# Patient Record
Sex: MALE | Race: WHITE | NOT HISPANIC OR LATINO | URBAN - METROPOLITAN AREA
[De-identification: names, ages, dates, MRNs, and addresses within clinical notes are randomized per-mention and may not be internally consistent; named-entity substitution may affect disease eponyms.]

---

## 2020-08-19 ENCOUNTER — TELEPHONE ENCOUNTER (OUTPATIENT)
Dept: URBAN - METROPOLITAN AREA CLINIC 25 | Facility: CLINIC | Age: 45
End: 2020-08-19

## 2020-08-19 RX ORDER — MESALAMINE 1.2 G/1
2 TABLETS TABLET, DELAYED RELEASE ORAL
Qty: 180 | Refills: 2 | OUTPATIENT

## 2020-09-14 ENCOUNTER — LAB OUTSIDE AN ENCOUNTER (OUTPATIENT)
Dept: URBAN - METROPOLITAN AREA CLINIC 25 | Facility: CLINIC | Age: 45
End: 2020-09-14

## 2020-09-14 ENCOUNTER — TELEPHONE ENCOUNTER (OUTPATIENT)
Dept: URBAN - METROPOLITAN AREA CLINIC 92 | Facility: CLINIC | Age: 45
End: 2020-09-14

## 2020-09-14 ENCOUNTER — OFFICE VISIT (OUTPATIENT)
Dept: URBAN - METROPOLITAN AREA CLINIC 25 | Facility: CLINIC | Age: 45
End: 2020-09-14
Payer: COMMERCIAL

## 2020-09-14 DIAGNOSIS — K51.20 ULCERATIVE PROCTITIS WITHOUT COMPLICATION: ICD-10-CM

## 2020-09-14 DIAGNOSIS — K92.1 HEMATOCHEZIA: ICD-10-CM

## 2020-09-14 PROCEDURE — G8420 CALC BMI NORM PARAMETERS: HCPCS | Performed by: INTERNAL MEDICINE

## 2020-09-14 PROCEDURE — G8427 DOCREV CUR MEDS BY ELIG CLIN: HCPCS | Performed by: INTERNAL MEDICINE

## 2020-09-14 PROCEDURE — 99213 OFFICE O/P EST LOW 20 MIN: CPT | Performed by: INTERNAL MEDICINE

## 2020-09-14 PROCEDURE — G9903 PT SCRN TBCO ID AS NON USER: HCPCS | Performed by: INTERNAL MEDICINE

## 2020-09-14 RX ORDER — MESALAMINE 1.2 G/1
2 TABLETS TABLET, DELAYED RELEASE ORAL
Qty: 180 | Refills: 2 | Status: ACTIVE | COMMUNITY

## 2020-09-14 RX ORDER — TADALAFIL 20 MG/1
TABLET, COATED ORAL
Qty: 0 | Refills: 0 | Status: ACTIVE | COMMUNITY
Start: 1900-01-01

## 2020-09-14 RX ORDER — BUPROPION HYDROCHLORIDE 100 MG/1
TABLET, FILM COATED ORAL
Qty: 0 | Refills: 0 | Status: ACTIVE | COMMUNITY
Start: 1900-01-01

## 2020-09-14 NOTE — HPI-TODAY'S VISIT:
Sept 2020:  doing well on mesalamine 2.4 gm / day. labs in july 2020 - normal cbc, cmp, crp. no rectal bleeding.

## 2020-09-14 NOTE — HPI-OTHER HISTORIES
May 2020 - telemed visit. bowel frequency is at baseline. on mesalamine. no new meds. no abdominal pain . no rectal bleeding. feels well. needs mesalamine refill.   Nov 2019 -  Presently on mesalamine 1.2 grams BID. No rectal bleeding. No abdominal pain. Had labs done with PCP within the last 3 months.   Labs April 2018: normal CRP, HIV negative, normal kidney function, normal LFTs, HB 14.  Flex sig June 2018:  - The recto-sigmoid colon, sigmoid colon, descending colon and splenic flexure are normal. - Retroflexion attempted but not successful because of small rectal vault. Distal rectum and anal verge were carefully examined with multiple views. - Several biopsies were obtained in the rectum, in the sigmoid colon and in the descending colon. Path revealed focal active colitis.   March 2018 -   On Lialda 2 a day and canasa - every other day. No rectal pain. No rectal bleeding. 1 - 2 BM/day. No abdominal pain.     First saw me Dec 2016 with rectal pain and hemorrhoids. Taking on Truvada for HIV prevention therapy.   Colonoscopy in Jan 2017 revealed  poor prep. Non-thrombosed external hemorrhoids and perianal skin tag found on perianal exam. Inflamed, nodular and ulcerated mucosa in the rectum.  Biopsied.  The descending colon, splenic flexure, transverse colon, hepatic flexure, ascending colon and cecum are normal.  Biopsied. The examined portion of the ileum was normal.  Biopsied. Several biopsies were obtained in the descending colon, in the transverse colon, in the ascending colon and in the cecum.  Labs in Feb 2017 revealed elevated CRP, Hep B not immune, ASCA positive.  Normal TI  Bx. Normal Rt colon Bx. Normal TC Bx. Chronic active colitis in sigmoid colon. Chronic active colitis in rectum.  Repeat colonoscopy in March 2017 revealed external hemorrhoids, moderate proctitis, and patchy ulcerations in sigmoid colon.  Bx Chronic active colitis in rectum and sigmoid colon. No crypt distortion or granulomas seen.   CT Enterography  April 2017 - proctitis, normal small bowel.   IBD Diagnostic panel April 2017 - not consistent with IBD. PANCA elevated. CRP 6.4, MARIELY 181.6  He was started on Prednisone taper after his recent colonoscopy. He is almost done with this. He stopped Rowasa since this was not working. Apriso was causing bloating. We are trying Delzicol. He reports mild improvement in his symptoms. Mild rectal bleeding. Persistent tenesmus. Persistent fecal urgency.     CT Enterography  April 2017 - proctitis, normal small bowel.  IBD Diagnostic panel April 2017 - not consistent with IBD. PANCA elevated. CRP 6.4, MARIELY 181.6 July 2017 - Flex sig normal rectum and sigmoid colon. Fair prep. No colitis.

## 2021-04-27 ENCOUNTER — OFFICE VISIT (OUTPATIENT)
Dept: URBAN - METROPOLITAN AREA SURGERY CENTER 20 | Facility: SURGERY CENTER | Age: 46
End: 2021-04-27
Payer: COMMERCIAL

## 2021-04-27 DIAGNOSIS — D12.3 ADENOMA OF TRANSVERSE COLON: ICD-10-CM

## 2021-04-27 DIAGNOSIS — K51.00 CHRONIC PANCOLONIC ULCERATIVE COLITIS: ICD-10-CM

## 2021-04-27 PROCEDURE — 45380 COLONOSCOPY AND BIOPSY: CPT | Performed by: INTERNAL MEDICINE

## 2021-04-27 PROCEDURE — 45385 COLONOSCOPY W/LESION REMOVAL: CPT | Performed by: INTERNAL MEDICINE

## 2021-04-27 PROCEDURE — G8907 PT DOC NO EVENTS ON DISCHARG: HCPCS | Performed by: INTERNAL MEDICINE

## 2021-04-27 RX ORDER — TADALAFIL 20 MG/1
TABLET, COATED ORAL
Qty: 0 | Refills: 0 | Status: ACTIVE | COMMUNITY
Start: 1900-01-01

## 2021-04-27 RX ORDER — BUPROPION HYDROCHLORIDE 100 MG/1
TABLET, FILM COATED ORAL
Qty: 0 | Refills: 0 | Status: ACTIVE | COMMUNITY
Start: 1900-01-01

## 2021-04-27 RX ORDER — MESALAMINE 1.2 G/1
2 TABLETS TABLET, DELAYED RELEASE ORAL
Qty: 180 | Refills: 2 | Status: ACTIVE | COMMUNITY

## 2021-05-05 ENCOUNTER — TELEPHONE ENCOUNTER (OUTPATIENT)
Dept: URBAN - METROPOLITAN AREA CLINIC 92 | Facility: CLINIC | Age: 46
End: 2021-05-05

## 2021-07-01 ENCOUNTER — ERX REFILL RESPONSE (OUTPATIENT)
Dept: URBAN - METROPOLITAN AREA CLINIC 25 | Facility: CLINIC | Age: 46
End: 2021-07-01

## 2021-07-01 RX ORDER — MESALAMINE 1.2 G/1
2 TABLETS TABLET, DELAYED RELEASE ORAL
Qty: 180 | Refills: 2 | OUTPATIENT

## 2021-07-01 RX ORDER — MESALAMINE 1.2 G/1
2 TABLETS 2 TIMES PER DAY ORALLY 90 DAYS TABLET, DELAYED RELEASE ORAL
Qty: 180 TABLET | Refills: 3 | OUTPATIENT

## 2021-09-13 ENCOUNTER — OFFICE VISIT (OUTPATIENT)
Dept: URBAN - METROPOLITAN AREA CLINIC 25 | Facility: CLINIC | Age: 46
End: 2021-09-13

## 2021-10-11 ENCOUNTER — OFFICE VISIT (OUTPATIENT)
Dept: URBAN - METROPOLITAN AREA CLINIC 25 | Facility: CLINIC | Age: 46
End: 2021-10-11
Payer: COMMERCIAL

## 2021-10-11 DIAGNOSIS — K51.20 ULCERATIVE PROCTITIS WITHOUT COMPLICATION: ICD-10-CM

## 2021-10-11 PROCEDURE — 99213 OFFICE O/P EST LOW 20 MIN: CPT | Performed by: INTERNAL MEDICINE

## 2021-10-11 RX ORDER — MESALAMINE 1.2 G/1
2 TABLETS 2 TIMES PER DAY ORALLY 90 DAYS TABLET, DELAYED RELEASE ORAL
Qty: 180 TABLET | Refills: 3 | Status: ACTIVE | COMMUNITY

## 2021-10-11 RX ORDER — TADALAFIL 20 MG/1
TABLET, COATED ORAL
Qty: 0 | Refills: 0 | Status: ACTIVE | COMMUNITY
Start: 1900-01-01

## 2021-10-11 RX ORDER — BUPROPION HYDROCHLORIDE 100 MG/1
TABLET, FILM COATED ORAL
Qty: 0 | Refills: 0 | Status: ACTIVE | COMMUNITY
Start: 1900-01-01

## 2021-10-11 NOTE — HPI-TODAY'S VISIT:
October 2021 visit:   Patient underwent a colonoscopy up to TI in April 2021, 3 mm hepatic flexure polyp: Tubular adenoma, no evidence of any endoscopic colitis, repeat colonoscopy advised between 3 to 5 years, biopsies revealed quiesced sent colitis.  covid 19 infection in sept 2021 - despite vaccination, did not need hospitalization.  asymptomatic. no rectal bleeding. regular bowels. no abdominal pain.

## 2021-10-11 NOTE — HPI-OTHER HISTORIES
Sept 2020:  doing well on mesalamine 2.4 gm / day. labs in july 2020 - normal cbc, cmp, crp. no rectal bleeding.   May 2020 - telemed visit. bowel frequency is at baseline. on mesalamine. no new meds. no abdominal pain . no rectal bleeding. feels well. needs mesalamine refill.   Nov 2019 -  Presently on mesalamine 1.2 grams BID. No rectal bleeding. No abdominal pain. Had labs done with PCP within the last 3 months.   Labs April 2018: normal CRP, HIV negative, normal kidney function, normal LFTs, HB 14.  Flex sig June 2018:  - The recto-sigmoid colon, sigmoid colon, descending colon and splenic flexure are normal. - Retroflexion attempted but not successful because of small rectal vault. Distal rectum and anal verge were carefully examined with multiple views. - Several biopsies were obtained in the rectum, in the sigmoid colon and in the descending colon. Path revealed focal active colitis.   March 2018 -   On Lialda 2 a day and canasa - every other day. No rectal pain. No rectal bleeding. 1 - 2 BM/day. No abdominal pain.     First saw me Dec 2016 with rectal pain and hemorrhoids. Taking on Truvada for HIV prevention therapy.   Colonoscopy in Jan 2017 revealed  poor prep. Non-thrombosed external hemorrhoids and perianal skin tag found on perianal exam. Inflamed, nodular and ulcerated mucosa in the rectum.  Biopsied.  The descending colon, splenic flexure, transverse colon, hepatic flexure, ascending colon and cecum are normal.  Biopsied. The examined portion of the ileum was normal.  Biopsied. Several biopsies were obtained in the descending colon, in the transverse colon, in the ascending colon and in the cecum.  Labs in Feb 2017 revealed elevated CRP, Hep B not immune, ASCA positive.  Normal TI  Bx. Normal Rt colon Bx. Normal TC Bx. Chronic active colitis in sigmoid colon. Chronic active colitis in rectum.  Repeat colonoscopy in March 2017 revealed external hemorrhoids, moderate proctitis, and patchy ulcerations in sigmoid colon.  Bx Chronic active colitis in rectum and sigmoid colon. No crypt distortion or granulomas seen.   CT Enterography  April 2017 - proctitis, normal small bowel.   IBD Diagnostic panel April 2017 - not consistent with IBD. PANCA elevated. CRP 6.4, MARIELY 181.6  He was started on Prednisone taper after his recent colonoscopy. He is almost done with this. He stopped Rowasa since this was not working. Apriso was causing bloating. We are trying Delzicol. He reports mild improvement in his symptoms. Mild rectal bleeding. Persistent tenesmus. Persistent fecal urgency.     CT Enterography  April 2017 - proctitis, normal small bowel.  IBD Diagnostic panel April 2017 - not consistent with IBD. PANCA elevated. CRP 6.4, MARIELY 181.6 July 2017 - Flex sig normal rectum and sigmoid colon. Fair prep. No colitis.

## 2022-03-24 ENCOUNTER — ERX REFILL RESPONSE (OUTPATIENT)
Dept: URBAN - METROPOLITAN AREA CLINIC 25 | Facility: CLINIC | Age: 47
End: 2022-03-24

## 2022-03-24 RX ORDER — MESALAMINE 1.2 G/1
2 TABLETS 2 TIMES PER DAY ORALLY 90 DAYS TABLET, DELAYED RELEASE ORAL
Qty: 180 TABLET | Refills: 3 | OUTPATIENT

## 2022-03-24 RX ORDER — MESALAMINE 1.2 G/1
2 TABLETS TABLET, DELAYED RELEASE ORAL ONCE A DAY
Qty: 180 TABLET | Refills: 6 | OUTPATIENT

## 2022-04-11 ENCOUNTER — OFFICE VISIT (OUTPATIENT)
Dept: URBAN - METROPOLITAN AREA CLINIC 124 | Facility: CLINIC | Age: 47
End: 2022-04-11

## 2022-04-18 ENCOUNTER — OFFICE VISIT (OUTPATIENT)
Dept: URBAN - METROPOLITAN AREA CLINIC 25 | Facility: CLINIC | Age: 47
End: 2022-04-18
Payer: COMMERCIAL

## 2022-04-18 VITALS
DIASTOLIC BLOOD PRESSURE: 76 MMHG | TEMPERATURE: 97.4 F | HEIGHT: 72 IN | BODY MASS INDEX: 22.75 KG/M2 | SYSTOLIC BLOOD PRESSURE: 117 MMHG | WEIGHT: 168 LBS | HEART RATE: 80 BPM

## 2022-04-18 DIAGNOSIS — K51.20 ULCERATIVE PROCTITIS WITHOUT COMPLICATION: ICD-10-CM

## 2022-04-18 PROCEDURE — 99213 OFFICE O/P EST LOW 20 MIN: CPT | Performed by: PHYSICIAN ASSISTANT

## 2022-04-18 RX ORDER — BUPROPION HYDROCHLORIDE 100 MG/1
TABLET, FILM COATED ORAL
Qty: 0 | Refills: 0 | Status: ACTIVE | COMMUNITY
Start: 1900-01-01

## 2022-04-18 RX ORDER — TADALAFIL 20 MG/1
TABLET, COATED ORAL
Qty: 0 | Refills: 0 | Status: ACTIVE | COMMUNITY
Start: 1900-01-01

## 2022-04-18 RX ORDER — MESALAMINE 1.2 G/1
2 TABLETS TABLET, DELAYED RELEASE ORAL ONCE A DAY
Qty: 180 TABLET | Refills: 6 | Status: ACTIVE | COMMUNITY

## 2022-04-18 NOTE — HPI-OTHER HISTORIES
October 2021 visit:   Patient underwent a colonoscopy up to TI in April 2021, 3 mm hepatic flexure polyp: Tubular adenoma, no evidence of any endoscopic colitis, repeat colonoscopy advised between 3 to 5 years, biopsies revealed quiesced sent colitis.  covid 19 infection in sept 2021 - despite vaccination, did not need hospitalization.  asymptomatic. no rectal bleeding. regular bowels. no abdominal pain.  Sept 2020:  doing well on mesalamine 2.4 gm / day. labs in july 2020 - normal cbc, cmp, crp. no rectal bleeding.   May 2020 - telemed visit. bowel frequency is at baseline. on mesalamine. no new meds. no abdominal pain . no rectal bleeding. feels well. needs mesalamine refill.   Nov 2019 -  Presently on mesalamine 1.2 grams BID. No rectal bleeding. No abdominal pain. Had labs done with PCP within the last 3 months.   Labs April 2018: normal CRP, HIV negative, normal kidney function, normal LFTs, HB 14.  Flex sig June 2018:  - The recto-sigmoid colon, sigmoid colon, descending colon and splenic flexure are normal. - Retroflexion attempted but not successful because of small rectal vault. Distal rectum and anal verge were carefully examined with multiple views. - Several biopsies were obtained in the rectum, in the sigmoid colon and in the descending colon. Path revealed focal active colitis.   March 2018 -   On Lialda 2 a day and canasa - every other day. No rectal pain. No rectal bleeding. 1 - 2 BM/day. No abdominal pain.     First saw me Dec 2016 with rectal pain and hemorrhoids. Taking on Truvada for HIV prevention therapy.   Colonoscopy in Jan 2017 revealed  poor prep. Non-thrombosed external hemorrhoids and perianal skin tag found on perianal exam. Inflamed, nodular and ulcerated mucosa in the rectum.  Biopsied.  The descending colon, splenic flexure, transverse colon, hepatic flexure, ascending colon and cecum are normal.  Biopsied. The examined portion of the ileum was normal.  Biopsied. Several biopsies were obtained in the descending colon, in the transverse colon, in the ascending colon and in the cecum.  Labs in Feb 2017 revealed elevated CRP, Hep B not immune, ASCA positive.  Normal TI  Bx. Normal Rt colon Bx. Normal TC Bx. Chronic active colitis in sigmoid colon. Chronic active colitis in rectum.  Repeat colonoscopy in March 2017 revealed external hemorrhoids, moderate proctitis, and patchy ulcerations in sigmoid colon.  Bx Chronic active colitis in rectum and sigmoid colon. No crypt distortion or granulomas seen.   CT Enterography  April 2017 - proctitis, normal small bowel.   IBD Diagnostic panel April 2017 - not consistent with IBD. PANCA elevated. CRP 6.4, MARIELY 181.6  He was started on Prednisone taper after his recent colonoscopy. He is almost done with this. He stopped Rowasa since this was not working. Apriso was causing bloating. We are trying Delzicol. He reports mild improvement in his symptoms. Mild rectal bleeding. Persistent tenesmus. Persistent fecal urgency.     CT Enterography  April 2017 - proctitis, normal small bowel.  IBD Diagnostic panel April 2017 - not consistent with IBD. PANCA elevated. CRP 6.4, MARIELY 181.6 July 2017 - Flex sig normal rectum and sigmoid colon. Fair prep. No colitis.

## 2022-04-18 NOTE — HPI-TODAY'S VISIT:
April 2022 Visit: Pt here for 4 month follow up on ulcerative colitis. Pt states symptoms are controlled on mesalamine. Pt's last colonoscopy 2021. We do not have labs from last visit. Pt denies abdominal pain, nausea, vomiting, or blood in stool. Denies any unintentional weight loss. His appetite is good. He reports compliance with mesalamine.

## 2022-10-17 ENCOUNTER — OFFICE VISIT (OUTPATIENT)
Dept: URBAN - METROPOLITAN AREA CLINIC 25 | Facility: CLINIC | Age: 47
End: 2022-10-17
Payer: COMMERCIAL

## 2022-10-17 VITALS
HEIGHT: 72 IN | HEART RATE: 70 BPM | DIASTOLIC BLOOD PRESSURE: 73 MMHG | WEIGHT: 167 LBS | TEMPERATURE: 97.5 F | BODY MASS INDEX: 22.62 KG/M2 | SYSTOLIC BLOOD PRESSURE: 123 MMHG

## 2022-10-17 DIAGNOSIS — K51.00 ULCERATIVE PANCOLITIS WITHOUT COMPLICATION: ICD-10-CM

## 2022-10-17 DIAGNOSIS — D12.6 TUBULAR ADENOMA OF COLON: ICD-10-CM

## 2022-10-17 PROBLEM — 444548001: Status: ACTIVE | Noted: 2022-10-17

## 2022-10-17 PROCEDURE — 99213 OFFICE O/P EST LOW 20 MIN: CPT | Performed by: INTERNAL MEDICINE

## 2022-10-17 RX ORDER — TADALAFIL 20 MG/1
TABLET, COATED ORAL
Qty: 0 | Refills: 0 | Status: ACTIVE | COMMUNITY
Start: 1900-01-01

## 2022-10-17 RX ORDER — MESALAMINE 1.2 G/1
2 TABLETS TABLET, DELAYED RELEASE ORAL ONCE A DAY
Qty: 180 TABLET | Refills: 6 | Status: ACTIVE | COMMUNITY

## 2022-10-17 RX ORDER — BUPROPION HYDROCHLORIDE 100 MG/1
TABLET, FILM COATED ORAL
Qty: 0 | Refills: 0 | Status: DISCONTINUED | COMMUNITY
Start: 1900-01-01

## 2022-10-17 NOTE — HPI-OTHER HISTORIES
April 2022 Visit: Pt here for 4 month follow up on ulcerative colitis. Pt states symptoms are controlled on mesalamine. Pt's last colonoscopy 2021. We do not have labs from last visit. Pt denies abdominal pain, nausea, vomiting, or blood in stool. Denies any unintentional weight loss. His appetite is good. He reports compliance with mesalamine.  October 2021 visit:   Patient underwent a colonoscopy up to TI in April 2021, 3 mm hepatic flexure polyp: Tubular adenoma, no evidence of any endoscopic colitis, repeat colonoscopy advised between 3 to 5 years, biopsies revealed quiesced sent colitis.  covid 19 infection in sept 2021 - despite vaccination, did not need hospitalization.  asymptomatic. no rectal bleeding. regular bowels. no abdominal pain.  Sept 2020:  doing well on mesalamine 2.4 gm / day. labs in july 2020 - normal cbc, cmp, crp. no rectal bleeding.   May 2020 - telemed visit. bowel frequency is at baseline. on mesalamine. no new meds. no abdominal pain . no rectal bleeding. feels well. needs mesalamine refill.   Nov 2019 -  Presently on mesalamine 1.2 grams BID. No rectal bleeding. No abdominal pain. Had labs done with PCP within the last 3 months.   Labs April 2018: normal CRP, HIV negative, normal kidney function, normal LFTs, HB 14.  Flex sig June 2018:  - The recto-sigmoid colon, sigmoid colon, descending colon and splenic flexure are normal. - Retroflexion attempted but not successful because of small rectal vault. Distal rectum and anal verge were carefully examined with multiple views. - Several biopsies were obtained in the rectum, in the sigmoid colon and in the descending colon. Path revealed focal active colitis.   March 2018 -   On Lialda 2 a day and canasa - every other day. No rectal pain. No rectal bleeding. 1 - 2 BM/day. No abdominal pain.     First saw me Dec 2016 with rectal pain and hemorrhoids. Taking on Truvada for HIV prevention therapy.   Colonoscopy in Jan 2017 revealed  poor prep. Non-thrombosed external hemorrhoids and perianal skin tag found on perianal exam. Inflamed, nodular and ulcerated mucosa in the rectum.  Biopsied.  The descending colon, splenic flexure, transverse colon, hepatic flexure, ascending colon and cecum are normal.  Biopsied. The examined portion of the ileum was normal.  Biopsied. Several biopsies were obtained in the descending colon, in the transverse colon, in the ascending colon and in the cecum.  Labs in Feb 2017 revealed elevated CRP, Hep B not immune, ASCA positive.  Normal TI  Bx. Normal Rt colon Bx. Normal TC Bx. Chronic active colitis in sigmoid colon. Chronic active colitis in rectum.  Repeat colonoscopy in March 2017 revealed external hemorrhoids, moderate proctitis, and patchy ulcerations in sigmoid colon.  Bx Chronic active colitis in rectum and sigmoid colon. No crypt distortion or granulomas seen.   CT Enterography  April 2017 - proctitis, normal small bowel.   IBD Diagnostic panel April 2017 - not consistent with IBD. PANCA elevated. CRP 6.4, MARIELY 181.6  He was started on Prednisone taper after his recent colonoscopy. He is almost done with this. He stopped Rowasa since this was not working. Apriso was causing bloating. We are trying Delzicol. He reports mild improvement in his symptoms. Mild rectal bleeding. Persistent tenesmus. Persistent fecal urgency.     CT Enterography  April 2017 - proctitis, normal small bowel.  IBD Diagnostic panel April 2017 - not consistent with IBD. PANCA elevated. CRP 6.4, MARIELY 181.6 July 2017 - Flex sig normal rectum and sigmoid colon. Fair prep. No colitis.

## 2022-10-17 NOTE — HPI-TODAY'S VISIT:
October 22 visit: Labs from October 22 revealed normal C-reactive protein, hemoglobin 14.2. most days having daily BM. stool freq is at baseline. no rectal bleeding. no abdominal pain. on mesalamine.

## 2023-03-20 ENCOUNTER — OFFICE VISIT (OUTPATIENT)
Dept: URBAN - METROPOLITAN AREA CLINIC 25 | Facility: CLINIC | Age: 48
End: 2023-03-20

## 2023-04-04 ENCOUNTER — ERX REFILL RESPONSE (OUTPATIENT)
Dept: URBAN - METROPOLITAN AREA CLINIC 25 | Facility: CLINIC | Age: 48
End: 2023-04-04

## 2023-04-04 RX ORDER — MESALAMINE 1.2 G/1
2 TABLETS TABLET, DELAYED RELEASE ORAL ONCE A DAY
Qty: 180 TABLET | Refills: 6 | OUTPATIENT

## 2023-04-24 ENCOUNTER — OFFICE VISIT (OUTPATIENT)
Dept: URBAN - METROPOLITAN AREA CLINIC 25 | Facility: CLINIC | Age: 48
End: 2023-04-24
Payer: COMMERCIAL

## 2023-04-24 VITALS
HEART RATE: 62 BPM | BODY MASS INDEX: 23.11 KG/M2 | TEMPERATURE: 97.5 F | DIASTOLIC BLOOD PRESSURE: 79 MMHG | WEIGHT: 170.6 LBS | SYSTOLIC BLOOD PRESSURE: 129 MMHG | HEIGHT: 72 IN

## 2023-04-24 DIAGNOSIS — D12.6 TUBULAR ADENOMA OF COLON: ICD-10-CM

## 2023-04-24 DIAGNOSIS — K51.00 ULCERATIVE PANCOLITIS WITHOUT COMPLICATION: ICD-10-CM

## 2023-04-24 PROCEDURE — 99213 OFFICE O/P EST LOW 20 MIN: CPT | Performed by: INTERNAL MEDICINE

## 2023-04-24 RX ORDER — TADALAFIL 20 MG/1
TABLET, COATED ORAL
Qty: 0 | Refills: 0 | Status: ACTIVE | COMMUNITY
Start: 1900-01-01

## 2023-04-24 RX ORDER — MESALAMINE 1.2 G/1
2 TABLETS TABLET, DELAYED RELEASE ORAL ONCE A DAY
Qty: 180 TABLET | Refills: 6 | Status: ACTIVE | COMMUNITY

## 2023-04-24 NOTE — HPI-OTHER HISTORIES
October 22 visit: Labs from October 22 revealed normal C-reactive protein, hemoglobin 14.2. most days having daily BM. stool freq is at baseline. no rectal bleeding. no abdominal pain. on mesalamine.  April 2022 Visit: Pt here for 4 month follow up on ulcerative colitis. Pt states symptoms are controlled on mesalamine. Pt's last colonoscopy 2021. We do not have labs from last visit. Pt denies abdominal pain, nausea, vomiting, or blood in stool. Denies any unintentional weight loss. His appetite is good. He reports compliance with mesalamine.  October 2021 visit:   Patient underwent a colonoscopy up to TI in April 2021, 3 mm hepatic flexure polyp: Tubular adenoma, no evidence of any endoscopic colitis, repeat colonoscopy advised between 3 to 5 years, biopsies revealed quiesced sent colitis.  covid 19 infection in sept 2021 - despite vaccination, did not need hospitalization.  asymptomatic. no rectal bleeding. regular bowels. no abdominal pain.  Sept 2020:  doing well on mesalamine 2.4 gm / day. labs in july 2020 - normal cbc, cmp, crp. no rectal bleeding.   May 2020 - telemed visit. bowel frequency is at baseline. on mesalamine. no new meds. no abdominal pain . no rectal bleeding. feels well. needs mesalamine refill.   Nov 2019 -  Presently on mesalamine 1.2 grams BID. No rectal bleeding. No abdominal pain. Had labs done with PCP within the last 3 months.   Labs April 2018: normal CRP, HIV negative, normal kidney function, normal LFTs, HB 14.  Flex sig June 2018:  - The recto-sigmoid colon, sigmoid colon, descending colon and splenic flexure are normal. - Retroflexion attempted but not successful because of small rectal vault. Distal rectum and anal verge were carefully examined with multiple views. - Several biopsies were obtained in the rectum, in the sigmoid colon and in the descending colon. Path revealed focal active colitis.   March 2018 -   On Lialda 2 a day and canasa - every other day. No rectal pain. No rectal bleeding. 1 - 2 BM/day. No abdominal pain.     First saw me Dec 2016 with rectal pain and hemorrhoids. Taking on Truvada for HIV prevention therapy.   Colonoscopy in Jan 2017 revealed  poor prep. Non-thrombosed external hemorrhoids and perianal skin tag found on perianal exam. Inflamed, nodular and ulcerated mucosa in the rectum.  Biopsied.  The descending colon, splenic flexure, transverse colon, hepatic flexure, ascending colon and cecum are normal.  Biopsied. The examined portion of the ileum was normal.  Biopsied. Several biopsies were obtained in the descending colon, in the transverse colon, in the ascending colon and in the cecum.  Labs in Feb 2017 revealed elevated CRP, Hep B not immune, ASCA positive.  Normal TI  Bx. Normal Rt colon Bx. Normal TC Bx. Chronic active colitis in sigmoid colon. Chronic active colitis in rectum.  Repeat colonoscopy in March 2017 revealed external hemorrhoids, moderate proctitis, and patchy ulcerations in sigmoid colon.  Bx Chronic active colitis in rectum and sigmoid colon. No crypt distortion or granulomas seen.   CT Enterography  April 2017 - proctitis, normal small bowel.   IBD Diagnostic panel April 2017 - not consistent with IBD. PANCA elevated. CRP 6.4, MARIELY 181.6  He was started on Prednisone taper after his recent colonoscopy. He is almost done with this. He stopped Rowasa since this was not working. Apriso was causing bloating. We are trying Delzicol. He reports mild improvement in his symptoms. Mild rectal bleeding. Persistent tenesmus. Persistent fecal urgency.     CT Enterography  April 2017 - proctitis, normal small bowel.  IBD Diagnostic panel April 2017 - not consistent with IBD. PANCA elevated. CRP 6.4, MARIELY 181.6 July 2017 - Flex sig normal rectum and sigmoid colon. Fair prep. No colitis.

## 2023-05-18 LAB — CALPROTECTIN, STOOL - QDX: (no result)

## 2023-05-24 ENCOUNTER — WEB ENCOUNTER (OUTPATIENT)
Dept: URBAN - METROPOLITAN AREA CLINIC 25 | Facility: CLINIC | Age: 48
End: 2023-05-24

## 2023-05-25 ENCOUNTER — WEB ENCOUNTER (OUTPATIENT)
Dept: URBAN - METROPOLITAN AREA CLINIC 25 | Facility: CLINIC | Age: 48
End: 2023-05-25

## 2023-05-25 ENCOUNTER — LAB OUTSIDE AN ENCOUNTER (OUTPATIENT)
Dept: URBAN - METROPOLITAN AREA CLINIC 25 | Facility: CLINIC | Age: 48
End: 2023-05-25

## 2023-05-25 RX ORDER — SIMETHICONE 125 MG
1 TABLET AFTER MEALS AND AT BEDTIME AS NEEDED TABLET,CHEWABLE ORAL
Qty: 40 TABLET | Refills: 0 | OUTPATIENT
Start: 2023-05-25 | End: 2023-06-04

## 2023-12-11 ENCOUNTER — OFFICE VISIT (OUTPATIENT)
Dept: URBAN - METROPOLITAN AREA CLINIC 25 | Facility: CLINIC | Age: 48
End: 2023-12-11
Payer: COMMERCIAL

## 2023-12-11 VITALS
HEART RATE: 92 BPM | HEIGHT: 72 IN | WEIGHT: 162 LBS | SYSTOLIC BLOOD PRESSURE: 130 MMHG | TEMPERATURE: 97.7 F | BODY MASS INDEX: 21.94 KG/M2 | DIASTOLIC BLOOD PRESSURE: 81 MMHG

## 2023-12-11 DIAGNOSIS — K51.00 ULCERATIVE PANCOLITIS WITHOUT COMPLICATION: ICD-10-CM

## 2023-12-11 DIAGNOSIS — D12.6 TUBULAR ADENOMA OF COLON: ICD-10-CM

## 2023-12-11 PROCEDURE — 99213 OFFICE O/P EST LOW 20 MIN: CPT | Performed by: INTERNAL MEDICINE

## 2023-12-11 RX ORDER — TADALAFIL 20 MG/1
TABLET, COATED ORAL
Qty: 0 | Refills: 0 | Status: ACTIVE | COMMUNITY
Start: 1900-01-01

## 2023-12-11 RX ORDER — MESALAMINE 1.2 G/1
2 TABLETS TABLET, DELAYED RELEASE ORAL ONCE A DAY
Qty: 180 TABLET | Refills: 6
End: 2025-09-01

## 2023-12-11 RX ORDER — MESALAMINE 1.2 G/1
2 TABLETS TABLET, DELAYED RELEASE ORAL ONCE A DAY
Qty: 180 TABLET | Refills: 6 | Status: ACTIVE | COMMUNITY

## 2023-12-11 NOTE — HPI-TODAY'S VISIT:
December 2023 visit: Fecal calprotectin in May 2023 was normal. no new symptoms. on mesalamine. no GI issues. normal cbc / cmp in april 2023. he did physcial exam [id number] with pcp and labs were normal. bowel freq is at baseline.

## 2023-12-11 NOTE — HPI-OTHER HISTORIES
April 2023 visit : no new symptoms. on mesalamine. no GI issues. normal cbc / cmp in april 2023.  October 22 visit: Labs from October 22 revealed normal C-reactive protein, hemoglobin 14.2. most days having daily BM. stool freq is at baseline. no rectal bleeding. no abdominal pain. on mesalamine.  April 2022 Visit: Pt here for 4 month follow up on ulcerative colitis. Pt states symptoms are controlled on mesalamine. Pt's last colonoscopy 2021. We do not have labs from last visit. Pt denies abdominal pain, nausea, vomiting, or blood in stool. Denies any unintentional weight loss. His appetite is good. He reports compliance with mesalamine.  October 2021 visit:   Patient underwent a colonoscopy up to TI in April 2021, 3 mm hepatic flexure polyp: Tubular adenoma, no evidence of any endoscopic colitis, repeat colonoscopy advised between 3 to 5 years, biopsies revealed quiesced sent colitis.  covid 19 infection in sept 2021 - despite vaccination, did not need hospitalization.  asymptomatic. no rectal bleeding. regular bowels. no abdominal pain.  Sept 2020:  doing well on mesalamine 2.4 gm / day. labs in july 2020 - normal cbc, cmp, crp. no rectal bleeding.   May 2020 - telemed visit. bowel frequency is at baseline. on mesalamine. no new meds. no abdominal pain . no rectal bleeding. feels well. needs mesalamine refill.   Nov 2019 -  Presently on mesalamine 1.2 grams BID. No rectal bleeding. No abdominal pain. Had labs done with PCP within the last 3 months.   Labs April 2018: normal CRP, HIV negative, normal kidney function, normal LFTs, HB 14.  Flex sig June 2018:  - The recto-sigmoid colon, sigmoid colon, descending colon and splenic flexure are normal. - Retroflexion attempted but not successful because of small rectal vault. Distal rectum and anal verge were carefully examined with multiple views. - Several biopsies were obtained in the rectum, in the sigmoid colon and in the descending colon. Path revealed focal active colitis.   March 2018 -   On Lialda 2 a day and canasa - every other day. No rectal pain. No rectal bleeding. 1 - 2 BM/day. No abdominal pain.     First saw me Dec 2016 with rectal pain and hemorrhoids. Taking on Truvada for HIV prevention therapy.   Colonoscopy in Jan 2017 revealed  poor prep. Non-thrombosed external hemorrhoids and perianal skin tag found on perianal exam. Inflamed, nodular and ulcerated mucosa in the rectum.  Biopsied.  The descending colon, splenic flexure, transverse colon, hepatic flexure, ascending colon and cecum are normal.  Biopsied. The examined portion of the ileum was normal.  Biopsied. Several biopsies were obtained in the descending colon, in the transverse colon, in the ascending colon and in the cecum.  Labs in Feb 2017 revealed elevated CRP, Hep B not immune, ASCA positive.  Normal TI  Bx. Normal Rt colon Bx. Normal TC Bx. Chronic active colitis in sigmoid colon. Chronic active colitis in rectum.  Repeat colonoscopy in March 2017 revealed external hemorrhoids, moderate proctitis, and patchy ulcerations in sigmoid colon.  Bx Chronic active colitis in rectum and sigmoid colon. No crypt distortion or granulomas seen.   CT Enterography  April 2017 - proctitis, normal small bowel.   IBD Diagnostic panel April 2017 - not consistent with IBD. PANCA elevated. CRP 6.4, MARIELY 181.6  He was started on Prednisone taper after his recent colonoscopy. He is almost done with this. He stopped Rowasa since this was not working. Apriso was causing bloating. We are trying Delzicol. He reports mild improvement in his symptoms. Mild rectal bleeding. Persistent tenesmus. Persistent fecal urgency.     CT Enterography  April 2017 - proctitis, normal small bowel.  IBD Diagnostic panel April 2017 - not consistent with IBD. PANCA elevated. CRP 6.4, MARIELY 181.6 July 2017 - Flex sig normal rectum and sigmoid colon. Fair prep. No colitis.

## 2024-05-13 ENCOUNTER — DASHBOARD ENCOUNTERS (OUTPATIENT)
Age: 49
End: 2024-05-13

## 2024-05-13 ENCOUNTER — LAB OUTSIDE AN ENCOUNTER (OUTPATIENT)
Dept: URBAN - METROPOLITAN AREA CLINIC 25 | Facility: CLINIC | Age: 49
End: 2024-05-13

## 2024-05-13 ENCOUNTER — OFFICE VISIT (OUTPATIENT)
Dept: URBAN - METROPOLITAN AREA CLINIC 25 | Facility: CLINIC | Age: 49
End: 2024-05-13
Payer: COMMERCIAL

## 2024-05-13 VITALS
SYSTOLIC BLOOD PRESSURE: 121 MMHG | WEIGHT: 160 LBS | HEIGHT: 72 IN | DIASTOLIC BLOOD PRESSURE: 79 MMHG | BODY MASS INDEX: 21.67 KG/M2 | HEART RATE: 69 BPM

## 2024-05-13 DIAGNOSIS — D12.6 TUBULAR ADENOMA OF COLON: ICD-10-CM

## 2024-05-13 DIAGNOSIS — R11.0 NAUSEA: ICD-10-CM

## 2024-05-13 DIAGNOSIS — K51.00 ULCERATIVE PANCOLITIS WITHOUT COMPLICATION: ICD-10-CM

## 2024-05-13 DIAGNOSIS — R12 HEARTBURN: ICD-10-CM

## 2024-05-13 PROCEDURE — 99214 OFFICE O/P EST MOD 30 MIN: CPT | Performed by: INTERNAL MEDICINE

## 2024-05-13 RX ORDER — MESALAMINE 1.2 G/1
2 TABLETS TABLET, DELAYED RELEASE ORAL ONCE A DAY
Qty: 180 TABLET | Refills: 6 | Status: ACTIVE | COMMUNITY
End: 2025-09-01

## 2024-05-13 RX ORDER — TADALAFIL 20 MG/1
TABLET, COATED ORAL
Qty: 0 | Refills: 0 | Status: ACTIVE | COMMUNITY
Start: 1900-01-01

## 2024-06-14 ENCOUNTER — WEB ENCOUNTER (OUTPATIENT)
Dept: URBAN - METROPOLITAN AREA CLINIC 25 | Facility: CLINIC | Age: 49
End: 2024-06-14

## 2024-07-16 ENCOUNTER — OFFICE VISIT (OUTPATIENT)
Dept: URBAN - METROPOLITAN AREA SURGERY CENTER 20 | Facility: SURGERY CENTER | Age: 49
End: 2024-07-16
Payer: COMMERCIAL

## 2024-07-16 DIAGNOSIS — Z87.19 PERSONAL HISTORY OF OTHER DISEASES OF THE DIGESTIVE SYSTEM: ICD-10-CM

## 2024-07-16 DIAGNOSIS — K62.89 ACUTE PROCTITIS: ICD-10-CM

## 2024-07-16 DIAGNOSIS — K52.89 OTHER SPECIFIED NONINFECTIVE GASTROENTERITIS AND COLITIS: ICD-10-CM

## 2024-07-16 DIAGNOSIS — R10.13 ABDOMINAL DISCOMFORT, EPIGASTRIC: ICD-10-CM

## 2024-07-16 DIAGNOSIS — K52.89 (LYMPHOCYTIC) MICROSCOPIC COLITIS: ICD-10-CM

## 2024-07-16 DIAGNOSIS — F41.9 ACUTE ANXIETY: ICD-10-CM

## 2024-07-16 DIAGNOSIS — K63.89 APPENDICITIS EPIPLOICA: ICD-10-CM

## 2024-07-16 DIAGNOSIS — K29.50 ANTRAL GASTRITIS: ICD-10-CM

## 2024-07-16 DIAGNOSIS — K29.30 CHRONIC SUPERFICIAL GASTRITIS: ICD-10-CM

## 2024-07-16 PROCEDURE — 43239 EGD BIOPSY SINGLE/MULTIPLE: CPT | Performed by: INTERNAL MEDICINE

## 2024-07-16 PROCEDURE — 45380 COLONOSCOPY AND BIOPSY: CPT | Performed by: INTERNAL MEDICINE

## 2024-07-16 PROCEDURE — 00813 ANES UPR LWR GI NDSC PX: CPT | Performed by: NURSE ANESTHETIST, CERTIFIED REGISTERED

## 2024-07-16 RX ORDER — MESALAMINE 1.2 G/1
2 TABLETS TABLET, DELAYED RELEASE ORAL ONCE A DAY
Qty: 180 TABLET | Refills: 6 | Status: ACTIVE | COMMUNITY
End: 2025-09-01

## 2024-07-16 RX ORDER — TADALAFIL 20 MG/1
TABLET, COATED ORAL
Qty: 0 | Refills: 0 | Status: ACTIVE | COMMUNITY
Start: 1900-01-01

## 2024-12-13 ENCOUNTER — ERX REFILL RESPONSE (OUTPATIENT)
Dept: URBAN - METROPOLITAN AREA CLINIC 25 | Facility: CLINIC | Age: 49
End: 2024-12-13

## 2024-12-13 RX ORDER — MESALAMINE 1.2 G/1
TAKE 2 TABLETS BY MOUTH EVERY DAY FOR 90 DAYS TABLET, DELAYED RELEASE ORAL
Qty: 180 TABLET | Refills: 6 | OUTPATIENT

## 2024-12-13 RX ORDER — MESALAMINE 1.2 G/1
2 TABLETS TABLET, DELAYED RELEASE ORAL ONCE A DAY
Qty: 180 TABLET | Refills: 6 | OUTPATIENT